# Patient Record
Sex: FEMALE | Race: WHITE | NOT HISPANIC OR LATINO | Employment: UNEMPLOYED | URBAN - METROPOLITAN AREA
[De-identification: names, ages, dates, MRNs, and addresses within clinical notes are randomized per-mention and may not be internally consistent; named-entity substitution may affect disease eponyms.]

---

## 2023-03-08 PROBLEM — C85.90 LYMPHOMA: Status: ACTIVE | Noted: 2023-03-08
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
## 2023-03-08 PROBLEM — S42.109A SCAPULA FRACTURE: Status: ACTIVE | Noted: 2023-03-08

## 2023-03-08 PROBLEM — S42.009A CLAVICLE FRACTURE: Status: ACTIVE | Noted: 2023-03-08

## 2023-03-08 PROBLEM — S22.32XA LEFT RIB FRACTURE: Status: ACTIVE | Noted: 2023-03-08

## 2023-03-08 PROBLEM — S22.019A: Status: ACTIVE | Noted: 2023-03-08

## 2023-03-08 PROBLEM — I10 HTN (HYPERTENSION): Status: ACTIVE | Noted: 2023-03-08

## 2023-03-08 PROBLEM — S00.03XA SCALP HEMATOMA: Status: ACTIVE | Noted: 2023-03-08

## 2023-03-08 PROBLEM — G45.9 TIA (TRANSIENT ISCHEMIC ATTACK): Status: ACTIVE | Noted: 2023-03-08

## 2023-03-08 PROBLEM — Z90.710 H/O: HYSTERECTOMY: Status: ACTIVE | Noted: 2023-03-08

## 2023-03-09 PROBLEM — S22.42XA CLOSED FRACTURE OF MULTIPLE RIBS OF LEFT SIDE: Status: ACTIVE | Noted: 2023-03-08

## 2023-03-09 PROBLEM — S42.109A SCAPULA FRACTURE: Status: RESOLVED | Noted: 2023-03-08 | Resolved: 2023-03-09

## 2023-03-10 PROBLEM — I16.0 HYPERTENSIVE URGENCY: Status: ACTIVE | Noted: 2023-03-08

## 2023-03-10 PROBLEM — E03.9 HYPOTHYROIDISM: Status: ACTIVE | Noted: 2023-03-10

## 2023-03-10 PROBLEM — G45.9 TIA (TRANSIENT ISCHEMIC ATTACK): Status: RESOLVED | Noted: 2023-03-08 | Resolved: 2023-03-10

## 2023-03-10 PROBLEM — S42.115A CLOSED NONDISPLACED FRACTURE OF BODY OF LEFT SCAPULA: Status: ACTIVE | Noted: 2023-03-10

## 2023-03-10 PROBLEM — M79.672 LEFT FOOT PAIN: Status: ACTIVE | Noted: 2023-03-10

## 2023-03-10 PROBLEM — M10.9 GOUT: Status: ACTIVE | Noted: 2023-03-10

## 2023-03-10 PROBLEM — K59.00 CONSTIPATION: Status: ACTIVE | Noted: 2023-03-10

## 2023-03-11 PROBLEM — E83.39 HYPOPHOSPHATEMIA: Status: ACTIVE | Noted: 2023-03-11

## 2023-03-12 PROBLEM — E83.39 HYPOPHOSPHATEMIA: Status: RESOLVED | Noted: 2023-03-11 | Resolved: 2023-03-12

## 2023-03-13 PROBLEM — K59.00 CONSTIPATION: Status: RESOLVED | Noted: 2023-03-10 | Resolved: 2023-03-13

## 2023-03-13 PROBLEM — R29.90 STROKE-LIKE SYMPTOMS: Status: ACTIVE | Noted: 2023-03-13

## 2023-03-13 PROBLEM — I63.9 CVA (CEREBRAL VASCULAR ACCIDENT): Status: ACTIVE | Noted: 2023-03-13

## 2023-03-13 PROBLEM — I63.9 CVA (CEREBRAL VASCULAR ACCIDENT): Status: RESOLVED | Noted: 2023-03-13 | Resolved: 2023-03-13

## 2023-03-13 PROBLEM — E87.1 HYPONATREMIA: Status: ACTIVE | Noted: 2023-03-13

## 2023-03-14 PROBLEM — I63.9 ACUTE CVA (CEREBROVASCULAR ACCIDENT): Status: ACTIVE | Noted: 2023-03-13

## 2023-03-15 PROBLEM — I16.0 HYPERTENSIVE URGENCY: Status: ACTIVE | Noted: 2023-03-15

## 2023-03-15 PROBLEM — G93.6 CYTOTOXIC CEREBRAL EDEMA: Status: ACTIVE | Noted: 2023-03-15

## 2023-03-15 PROBLEM — E78.2 MIXED HYPERLIPIDEMIA: Status: ACTIVE | Noted: 2023-03-15

## 2023-03-15 PROBLEM — I63.511 ACUTE ISCHEMIC RIGHT MCA STROKE: Status: ACTIVE | Noted: 2023-03-13

## 2023-03-22 ENCOUNTER — TELEPHONE (OUTPATIENT)
Dept: ADMINISTRATIVE | Facility: CLINIC | Age: 88
End: 2023-03-22

## 2023-03-22 ENCOUNTER — PATIENT OUTREACH (OUTPATIENT)
Dept: ADMINISTRATIVE | Facility: OTHER | Age: 88
End: 2023-03-22

## 2023-03-22 NOTE — PROGRESS NOTES
Requesting Home Health referral if [facility] doesn't show up today. Will call me in the morning if their home health is a no show. I asked caregiver to call me directly in the morning if [facility] does not show up for home health today. I will follow on [facility] platform.

## 2023-03-27 NOTE — PROGRESS NOTES
CHW - Case Closure    This Community Health Worker spoke to caregiver via telephone today.   Pt/Caregiver reported: Requesting Home Health referral if [facility] doesn't show up today. Will call me in the morning if their home health is a no show. No call back and no response before end of day 3/24/23.   Pt/Caregiver denied any additional needs at this time and agrees with episode closure at this time.  Provided caregiver with Community Health Worker's contact information and encouraged him/her to contact this Community Health Worker if additional needs arise.

## 2023-06-19 PROBLEM — I63.9 ACUTE CVA (CEREBROVASCULAR ACCIDENT): Status: RESOLVED | Noted: 2023-03-13 | Resolved: 2023-06-19

## 2023-06-19 PROBLEM — I63.511 ACUTE ISCHEMIC RIGHT MCA STROKE: Status: RESOLVED | Noted: 2023-03-13 | Resolved: 2023-06-19
